# Patient Record
Sex: FEMALE
[De-identification: names, ages, dates, MRNs, and addresses within clinical notes are randomized per-mention and may not be internally consistent; named-entity substitution may affect disease eponyms.]

---
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
## 2018-05-20 ENCOUNTER — TRANSCRIPTION ENCOUNTER (OUTPATIENT)
Age: 18
End: 2018-05-20

## 2022-12-01 ENCOUNTER — APPOINTMENT (OUTPATIENT)
Dept: ORTHOPEDIC SURGERY | Facility: CLINIC | Age: 22
End: 2022-12-01
Payer: COMMERCIAL

## 2022-12-01 DIAGNOSIS — Z00.00 ENCOUNTER FOR GENERAL ADULT MEDICAL EXAMINATION W/OUT ABNORMAL FINDINGS: ICD-10-CM

## 2022-12-01 DIAGNOSIS — S93.602A UNSPECIFIED SPRAIN OF LEFT FOOT, INITIAL ENCOUNTER: ICD-10-CM

## 2022-12-01 PROCEDURE — 73600 X-RAY EXAM OF ANKLE: CPT | Mod: LT

## 2022-12-01 PROCEDURE — 99204 OFFICE O/P NEW MOD 45 MIN: CPT | Mod: 25

## 2022-12-01 PROCEDURE — L4361: CPT | Mod: LT

## 2022-12-01 PROCEDURE — 99203 OFFICE O/P NEW LOW 30 MIN: CPT | Mod: 25

## 2022-12-01 PROCEDURE — 73630 X-RAY EXAM OF FOOT: CPT | Mod: LT

## 2022-12-01 NOTE — PHYSICAL EXAM
[NL (40)] : plantar flexion 40 degrees [2+] : posterior tibialis pulse: 2+ [Normal] : saphenous nerve sensation normal [Left] : left foot [Weight -] : weightbearing [4___] : eversion 4[unfilled]/5 [] : mildly antalgic [There are no fractures, subluxations or dislocations. No significant abnormalities are seen] : There are no fractures, subluxations or dislocations. No significant abnormalities are seen [de-identified] : inversion 15 degrees [TWNoteComboBox7] : dorsiflexion 15 degrees

## 2022-12-01 NOTE — ASSESSMENT
[FreeTextEntry1] : Patient can be wbat in a short cam boot.\par Ice/nsaids prn.\par No high impact activities.

## 2022-12-01 NOTE — HISTORY OF PRESENT ILLNESS
[Sudden] : sudden [5] : 5 [Dull/Aching] : dull/aching [Localized] : localized [Constant] : constant [Leisure] : leisure [Rest] : rest [Walking] : walking [de-identified] : Pt is a 22 year old F who presents today for evaluation of her left ankle. Pt states that she fell earlier today after a meditation class when she stood up and felt lightheaded.Denies previous injury. No numbness/tingling. Ice to affected area, elevation, Advil PRN. No formal treatment to date. WB in sneakers. The patient is mentally and developmentally delayed. She presents today with her mother.  [] : Post Surgical Visit: no [FreeTextEntry1] : L ankle [FreeTextEntry3] : 12/1/22

## 2023-01-05 ENCOUNTER — APPOINTMENT (OUTPATIENT)
Dept: ORTHOPEDIC SURGERY | Facility: CLINIC | Age: 23
End: 2023-01-05

## 2024-03-19 ENCOUNTER — APPOINTMENT (OUTPATIENT)
Dept: ORTHOPEDIC SURGERY | Facility: CLINIC | Age: 24
End: 2024-03-19
Payer: COMMERCIAL

## 2024-03-19 VITALS — BODY MASS INDEX: 18.4 KG/M2 | WEIGHT: 100 LBS | HEIGHT: 62 IN

## 2024-03-19 DIAGNOSIS — G40.909 EPILEPSY, UNSPECIFIED, NOT INTRACTABLE, W/OUT STATUS EPILEPTICUS: ICD-10-CM

## 2024-03-19 PROCEDURE — 99213 OFFICE O/P EST LOW 20 MIN: CPT

## 2024-03-19 RX ORDER — FELBAMATE 600 MG/1
TABLET ORAL
Refills: 0 | Status: ACTIVE | COMMUNITY

## 2024-03-19 RX ORDER — NORETHINDRONE ACETATE AND ETHINYL ESTRADIOL, ETHINYL ESTRADIOL AND FERROUS FUMARATE 1MG-10(24)
KIT ORAL
Refills: 0 | Status: ACTIVE | COMMUNITY

## 2024-03-19 RX ORDER — ZONISAMIDE 25 MG/1
CAPSULE ORAL
Refills: 0 | Status: ACTIVE | COMMUNITY

## 2024-03-19 NOTE — HISTORY OF PRESENT ILLNESS
[Left Leg] : left leg [Nothing helps with pain getting better] : Nothing helps with pain getting better [Walking] : walking [Exercising] : exercising [FreeTextEntry5] : left leg pain after karate 1 month ago [de-identified] : none

## 2024-03-19 NOTE — REASON FOR VISIT
[FreeTextEntry2] : NI - left leg Patient comes to office with her mom with a couple weeks of pain in the left thigh and calf region she denies any specific injury but is very involved in different exercise including pickleball and martial arts examination today reveals him ambulates with a mild stiff gait on the left lower extremity she has no significant swelling but is somewhat tender in the left posterior calf muscle neurovascular status grossly intact in the left lower extremity she also was noted to have some mild tenderness in the left anterior quad and a little bit toward the trochanteric bursa bursa of the left hip At this point I recommended decreased activity with some mild anti-inflammatory to alleviate her symptoms she should hold off on any sports or exercise type activities until she is rechecked and rechecked again in 2 weeks.

## 2024-03-19 NOTE — ASSESSMENT
[FreeTextEntry1] : Patient will refrain from exercise and sports except for possibly some water therapy over the next 2 weeks she will return at that point and hopefully we can get her back to her normal activities  I have also referred her for some therapy to try and alleviate her present symptoms and get back to normal.

## 2024-04-16 ENCOUNTER — APPOINTMENT (OUTPATIENT)
Dept: ORTHOPEDIC SURGERY | Facility: CLINIC | Age: 24
End: 2024-04-16
Payer: COMMERCIAL

## 2024-04-16 VITALS — BODY MASS INDEX: 18.4 KG/M2 | HEIGHT: 62 IN | WEIGHT: 100 LBS

## 2024-04-16 PROCEDURE — 99213 OFFICE O/P EST LOW 20 MIN: CPT

## 2024-04-16 NOTE — HISTORY OF PRESENT ILLNESS
[Left Leg] : left leg [Nothing helps with pain getting better] : Nothing helps with pain getting better [Walking] : walking [Exercising] : exercising [FreeTextEntry5] : left leg pain after karate 1 month ago. PT is helping her pain [de-identified] : none

## 2024-04-16 NOTE — REASON FOR VISIT
[FreeTextEntry2] : FU - left leg Patient returns to the office in follow-up regarding her symptoms that he had that she had in her left leg before she has been attending physical therapy and gradually improving presently she has some mild tenderness in the lumbar paraspinals more so on the left than the right her hamstrings are tight bilaterally she has mild tenderness in the left calf muscle but no significant swelling She can flex approximately 4 to 5 inches from her toes

## 2024-04-16 NOTE — ASSESSMENT
[FreeTextEntry1] : Patient will continue work with her therapist and also do some home exercise especially for flexibility she should return for follow-up visit in approximately 2 to 3 weeks.

## 2024-05-07 ENCOUNTER — APPOINTMENT (OUTPATIENT)
Dept: ORTHOPEDIC SURGERY | Facility: CLINIC | Age: 24
End: 2024-05-07
Payer: COMMERCIAL

## 2024-05-07 VITALS — BODY MASS INDEX: 18.4 KG/M2 | HEIGHT: 62 IN | WEIGHT: 100 LBS

## 2024-05-07 DIAGNOSIS — S76.112A STRAIN OF LEFT QUADRICEPS MUSCLE, FASCIA AND TENDON, INITIAL ENCOUNTER: ICD-10-CM

## 2024-05-07 DIAGNOSIS — S86.812A STRAIN OF OTHER MUSCLE(S) AND TENDON(S) AT LOWER LEG LEVEL, LEFT LEG, INITIAL ENCOUNTER: ICD-10-CM

## 2024-05-07 PROCEDURE — 99213 OFFICE O/P EST LOW 20 MIN: CPT

## 2024-05-07 NOTE — HISTORY OF PRESENT ILLNESS
[Left Leg] : left leg [Walking] : walking [Exercising] : exercising [8] : 8 [3] : 3 [Stabbing] : stabbing [Tightness] : tightness [] : yes [Intermittent] : intermittent [Physical therapy] : physical therapy [de-identified] : FU - left leg to calf. Previously went to PT and was improving with it. Pain is about the same at this visit. [FreeTextEntry5] : left leg pain after karate 1 month ago. PT is helping her pain [FreeTextEntry7] : left leg thigh to calf [de-identified] : Physical therapy - last visit on Friday 5/3/24

## 2024-05-07 NOTE — REASON FOR VISIT
[FreeTextEntry2] : FU - left leg to calf. Patient returns to the office describing gradual improvement with her symptoms in the left leg with her recent physical therapy at this point Khushi she is about snf better she is ambulating with a slow minimally antalgic gait she has no significant pain or tenderness in the hamstring area or left hip region neurovascular status grossly intact left lower extremity good range of motion both left hip and knee region

## 2024-06-04 ENCOUNTER — APPOINTMENT (OUTPATIENT)
Dept: ORTHOPEDIC SURGERY | Facility: CLINIC | Age: 24
End: 2024-06-04

## 2024-06-26 ENCOUNTER — APPOINTMENT (OUTPATIENT)
Dept: ORTHOPEDIC SURGERY | Facility: CLINIC | Age: 24
End: 2024-06-26

## 2024-06-26 VITALS — HEIGHT: 62 IN | WEIGHT: 104 LBS | BODY MASS INDEX: 19.14 KG/M2

## 2024-06-26 DIAGNOSIS — M76.892 OTHER SPECIFIED ENTHESOPATHIES OF LEFT LOWER LIMB, EXCLUDING FOOT: ICD-10-CM

## 2024-06-26 PROCEDURE — 99204 OFFICE O/P NEW MOD 45 MIN: CPT

## 2024-06-26 PROCEDURE — 99203 OFFICE O/P NEW LOW 30 MIN: CPT

## 2025-04-08 ENCOUNTER — APPOINTMENT (OUTPATIENT)
Dept: ORTHOPEDIC SURGERY | Facility: CLINIC | Age: 25
End: 2025-04-08
Payer: COMMERCIAL

## 2025-04-08 VITALS — BODY MASS INDEX: 19.14 KG/M2 | WEIGHT: 104 LBS | HEIGHT: 62 IN

## 2025-04-08 DIAGNOSIS — S76.312A STRAIN OF MUSCLE, FASCIA AND TENDON OF THE POSTERIOR MUSCLE GROUP AT THIGH LEVEL, LEFT THIGH, INITIAL ENCOUNTER: ICD-10-CM

## 2025-04-08 DIAGNOSIS — M76.892 OTHER SPECIFIED ENTHESOPATHIES OF LEFT LOWER LIMB, EXCLUDING FOOT: ICD-10-CM

## 2025-04-08 PROCEDURE — 99213 OFFICE O/P EST LOW 20 MIN: CPT

## 2025-04-08 PROCEDURE — 73552 X-RAY EXAM OF FEMUR 2/>: CPT | Mod: LT

## 2025-04-24 ENCOUNTER — APPOINTMENT (OUTPATIENT)
Dept: ORTHOPEDIC SURGERY | Facility: CLINIC | Age: 25
End: 2025-04-24
Payer: COMMERCIAL

## 2025-04-24 VITALS — HEIGHT: 62 IN | WEIGHT: 104 LBS | BODY MASS INDEX: 19.14 KG/M2

## 2025-04-24 DIAGNOSIS — M76.892 OTHER SPECIFIED ENTHESOPATHIES OF LEFT LOWER LIMB, EXCLUDING FOOT: ICD-10-CM

## 2025-04-24 PROCEDURE — 99213 OFFICE O/P EST LOW 20 MIN: CPT

## 2025-05-06 ENCOUNTER — APPOINTMENT (OUTPATIENT)
Dept: MRI IMAGING | Facility: CLINIC | Age: 25
End: 2025-05-06

## 2025-05-09 ENCOUNTER — APPOINTMENT (OUTPATIENT)
Dept: ORTHOPEDIC SURGERY | Facility: CLINIC | Age: 25
End: 2025-05-09

## 2025-05-28 ENCOUNTER — APPOINTMENT (OUTPATIENT)
Dept: MRI IMAGING | Facility: CLINIC | Age: 25
End: 2025-05-28
Payer: COMMERCIAL

## 2025-05-28 PROCEDURE — 73718 MRI LOWER EXTREMITY W/O DYE: CPT | Mod: LT

## 2025-05-30 ENCOUNTER — TRANSCRIPTION ENCOUNTER (OUTPATIENT)
Age: 25
End: 2025-05-30

## 2025-06-06 ENCOUNTER — APPOINTMENT (OUTPATIENT)
Dept: ORTHOPEDIC SURGERY | Facility: CLINIC | Age: 25
End: 2025-06-06
Payer: COMMERCIAL

## 2025-06-06 VITALS — BODY MASS INDEX: 19.14 KG/M2 | WEIGHT: 104 LBS | HEIGHT: 62 IN

## 2025-06-06 PROCEDURE — 99213 OFFICE O/P EST LOW 20 MIN: CPT
